# Patient Record
Sex: MALE | URBAN - METROPOLITAN AREA
[De-identification: names, ages, dates, MRNs, and addresses within clinical notes are randomized per-mention and may not be internally consistent; named-entity substitution may affect disease eponyms.]

---

## 2018-10-10 ENCOUNTER — OFFICE VISIT (OUTPATIENT)
Dept: URBAN - METROPOLITAN AREA CLINIC 81 | Facility: CLINIC | Age: 83
End: 2018-10-10
Payer: MEDICARE

## 2018-10-10 DIAGNOSIS — Z96.1 PRESENCE OF INTRAOCULAR LENS: Primary | ICD-10-CM

## 2018-10-10 DIAGNOSIS — H35.363 DRUSEN (DEGENERATIVE) OF MACULA, BILATERAL: ICD-10-CM

## 2018-10-10 DIAGNOSIS — H35.3121 NEXDTVE AGE-RELATED MCLR DEGN, LEFT EYE, EARLY DRY STAGE: ICD-10-CM

## 2018-10-10 PROCEDURE — 92014 COMPRE OPH EXAM EST PT 1/>: CPT | Performed by: OPTOMETRIST

## 2018-10-10 PROCEDURE — 92134 CPTRZ OPH DX IMG PST SGM RTA: CPT | Performed by: OPTOMETRIST

## 2018-10-10 ASSESSMENT — KERATOMETRY
OD: 43.50
OS: 43.63

## 2018-10-10 ASSESSMENT — VISUAL ACUITY
OS: 20/30
OD: 20/30

## 2018-10-10 ASSESSMENT — INTRAOCULAR PRESSURE
OS: 10
OD: 10

## 2018-10-10 NOTE — IMPRESSION/PLAN
Impression: Drusen (degenerative) of macula, bilateral: H35.363. Plan: Order baseline OCT. Will continue to observe/monitor. Discussed continued use of Ocuvite, green leafy veggies.  Home Amsler given

## 2018-11-27 ENCOUNTER — TESTING ONLY (OUTPATIENT)
Dept: URBAN - METROPOLITAN AREA CLINIC 81 | Facility: CLINIC | Age: 83
End: 2018-11-27

## 2018-11-27 PROCEDURE — V2799 MISC VISION ITEM OR SERVICE: HCPCS | Performed by: OPTOMETRIST

## 2018-11-27 ASSESSMENT — KERATOMETRY
OD: 43.25
OS: 43.63

## 2018-11-27 ASSESSMENT — VISUAL ACUITY
OD: 20/30
OS: 20/25

## 2018-11-27 NOTE — IMPRESSION/PLAN
Impression: Presbyopia: H52.4.  Plan: Recommend go SV dist.  In future may want to consider sv near as well

## 2019-03-04 ENCOUNTER — OFFICE VISIT (OUTPATIENT)
Dept: URBAN - METROPOLITAN AREA CLINIC 81 | Facility: CLINIC | Age: 84
End: 2019-03-04
Payer: MEDICARE

## 2019-03-04 DIAGNOSIS — H04.123 DRY EYE SYNDROME OF BILATERAL LACRIMAL GLANDS: Primary | ICD-10-CM

## 2019-03-04 PROCEDURE — 99213 OFFICE O/P EST LOW 20 MIN: CPT | Performed by: OPTOMETRIST

## 2019-03-04 ASSESSMENT — INTRAOCULAR PRESSURE
OD: 10
OS: 12

## 2019-03-04 NOTE — IMPRESSION/PLAN
Impression: Dry eye syndrome of bilateral lacrimal glands: H04.123. Plan: Discussed diagnosis in detail with patient. Advise AT gtt during day, carline/gel hs. 2+ min lid closure after instill gtt. If no general health conflicts discussed benefits of use of Omega 3's. Consider humidifier. Pt understands GUS can be a chronic condition requiring ongoing Tx. Use glasses to balance monovision for extended far or near. 
Discussed DE.

## 2019-03-25 ENCOUNTER — OFFICE VISIT (OUTPATIENT)
Dept: URBAN - METROPOLITAN AREA CLINIC 81 | Facility: CLINIC | Age: 84
End: 2019-03-25

## 2019-03-25 DIAGNOSIS — H52.4 PRESBYOPIA: Primary | ICD-10-CM

## 2019-03-25 PROCEDURE — V2799 MISC VISION ITEM OR SERVICE: HCPCS | Performed by: OPTOMETRIST

## 2019-03-25 ASSESSMENT — VISUAL ACUITY
OS: 20/25
OD: 20/30

## 2019-03-25 ASSESSMENT — KERATOMETRY
OS: 43.50
OD: 43.38

## 2019-03-25 NOTE — IMPRESSION/PLAN
Impression: Drusen (degenerative) of macula, bilateral: H35.363.  Plan: Due to long standing drusen & more recent mentioning of possible VF changes I will order mac OCT & 30-2 vf

## 2019-03-25 NOTE — IMPRESSION/PLAN
Impression: Presbyopia: H52.4. Plan: Reviewed with pt limitations of fully correcting underlying surgical monovision with bifocal glasses. After much discussion & trial decided to try removing correction for near (OS) in glasses to see if satisfies pt desires/expectations. Pt immediately prefers no dist lens OS and wants to try enhanced near for OS in glasses.

## 2019-03-26 ENCOUNTER — OFFICE VISIT (OUTPATIENT)
Dept: URBAN - METROPOLITAN AREA CLINIC 81 | Facility: CLINIC | Age: 84
End: 2019-03-26
Payer: MEDICARE

## 2019-03-26 DIAGNOSIS — H53.453: ICD-10-CM

## 2019-03-26 PROCEDURE — 92083 EXTENDED VISUAL FIELD XM: CPT | Performed by: OPTOMETRIST

## 2019-03-26 PROCEDURE — 92134 CPTRZ OPH DX IMG PST SGM RTA: CPT | Performed by: OPTOMETRIST

## 2019-03-26 PROCEDURE — 92014 COMPRE OPH EXAM EST PT 1/>: CPT | Performed by: OPTOMETRIST

## 2019-03-26 ASSESSMENT — INTRAOCULAR PRESSURE
OS: 10
OD: 10

## 2019-03-26 NOTE — IMPRESSION/PLAN
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 Pt mentions possible VF loss yesterday. VF ordered Plan: Homonymous Inferior Quadrantopia. Pt urged to consult with PCP today to order MRI/neurological testing. Have PCP call me if has any questions. Pt states had possible TIA ~4 years ago & MRI at that time.

## 2019-08-06 ENCOUNTER — OFFICE VISIT (OUTPATIENT)
Dept: URBAN - METROPOLITAN AREA CLINIC 81 | Facility: CLINIC | Age: 84
End: 2019-08-06
Payer: MEDICARE

## 2019-08-06 DIAGNOSIS — H43.813 VITREOUS DEGENERATION, BILATERAL: ICD-10-CM

## 2019-08-06 DIAGNOSIS — H35.40 PERIPHERAL RETINAL DEGENERATION: ICD-10-CM

## 2019-08-06 PROCEDURE — 92014 COMPRE OPH EXAM EST PT 1/>: CPT | Performed by: OPTOMETRIST

## 2019-08-06 RX ORDER — OMEGA-3-ACID ETHYL ESTERS 900 MG/1
1 GRAM CAPSULE, LIQUID FILLED ORAL
Qty: 60 | Refills: 3 | Status: ACTIVE
Start: 2019-08-06

## 2019-08-06 RX ORDER — ICOSAPENT ETHYL 500 MG/1
CAPSULE ORAL
Qty: 1 | Refills: 0 | Status: ACTIVE
Start: 2019-08-06

## 2019-08-06 ASSESSMENT — INTRAOCULAR PRESSURE
OD: 12
OS: 14

## 2019-08-06 NOTE — IMPRESSION/PLAN
Impression: Drusen (degenerative) of macula, bilateral: H35.363.   Long standing Plan: Schedule consult with Dr. Tigist Jade

## 2019-08-06 NOTE — IMPRESSION/PLAN
Impression: Dry eye syndrome of bilateral lacrimal glands: H04.123. Plan: Reviewed/improved pt understanding of use of AT with lid closure, gel  hs & omega 3's. Rx lovaza omega 3's but pt advised to check with PCP before starting.

## 2019-08-06 NOTE — IMPRESSION/PLAN
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 Had MRI after found earlier this year.  Plan: Pt scheduled to see PCP (&neurologist?) this month

## 2019-08-06 NOTE — IMPRESSION/PLAN
Impression: Presence of intraocular lens: Z96.1. No sn of 2`membrane Plan: Recommend UV protection. Will continue to observe/monitor. Pt understands.

## 2019-08-06 NOTE — IMPRESSION/PLAN
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  long standing Plan: Schedule to see Dr. Miguel Anna

## 2019-08-06 NOTE — IMPRESSION/PLAN
Impression: Vitreous degeneration, bilateral: H43.813. Plan: discussed SSRD and possible emergency if experience.  
Schedule to see  Dr. Guerrero Robison

## 2019-09-10 ENCOUNTER — OFFICE VISIT (OUTPATIENT)
Dept: URBAN - METROPOLITAN AREA CLINIC 33 | Facility: CLINIC | Age: 84
End: 2019-09-10
Payer: MEDICARE

## 2019-09-10 DIAGNOSIS — H35.3131 NEXDTVE AGE-RELATED MCLR DEGN, BILATERAL, EARLY DRY STAGE: Primary | ICD-10-CM

## 2019-09-10 DIAGNOSIS — H43.22 CRYSTALLINE DEPOSITS IN VITREOUS BODY, LEFT EYE: ICD-10-CM

## 2019-09-10 PROCEDURE — 92134 CPTRZ OPH DX IMG PST SGM RTA: CPT | Performed by: OPHTHALMOLOGY

## 2019-09-10 PROCEDURE — 99203 OFFICE O/P NEW LOW 30 MIN: CPT | Performed by: OPHTHALMOLOGY

## 2019-09-10 PROCEDURE — 99214 OFFICE O/P EST MOD 30 MIN: CPT | Performed by: OPHTHALMOLOGY

## 2019-09-10 ASSESSMENT — INTRAOCULAR PRESSURE
OS: 12
OD: 12

## 2019-09-10 NOTE — IMPRESSION/PLAN
Impression: Crystalline deposits in vitreous body, left eye: H43.22. Plan: Discussed diagnosis in detail with patient. No treatment is required at this time. Reassured patient of current condition and treatment. Will continue to observe condition and or symptoms.

## 2019-09-10 NOTE — IMPRESSION/PLAN
Impression: Nexdtve age-related mclr degn, bilateral, early dry stage: H35.3131. Plan: OCT ordered and performed today. Discussed diagnosis with patient, OCT demonstrates the lack of SRF or CME. Additional treatment is not recommended at this time but we will continue to monitor frequently. The patient was advised to continue AREDS multi-vitamins, monitor the amsler grid closely, monitor vision one eye at a time. Call immediately with any vision changes. Patient agrees with plan.